# Patient Record
Sex: FEMALE
[De-identification: names, ages, dates, MRNs, and addresses within clinical notes are randomized per-mention and may not be internally consistent; named-entity substitution may affect disease eponyms.]

---

## 2019-07-24 ENCOUNTER — HOSPITAL ENCOUNTER (OUTPATIENT)
Dept: HOSPITAL 88 - OR | Age: 54
Discharge: HOME | End: 2019-07-24
Payer: COMMERCIAL

## 2019-07-24 VITALS — SYSTOLIC BLOOD PRESSURE: 111 MMHG | DIASTOLIC BLOOD PRESSURE: 68 MMHG

## 2019-07-24 DIAGNOSIS — Z79.84: ICD-10-CM

## 2019-07-24 DIAGNOSIS — E11.9: ICD-10-CM

## 2019-07-24 DIAGNOSIS — Z01.810: ICD-10-CM

## 2019-07-24 DIAGNOSIS — Z12.11: Primary | ICD-10-CM

## 2019-07-24 DIAGNOSIS — K62.1: ICD-10-CM

## 2019-07-24 DIAGNOSIS — E66.01: ICD-10-CM

## 2019-07-24 DIAGNOSIS — I10: ICD-10-CM

## 2019-07-24 DIAGNOSIS — K63.5: ICD-10-CM

## 2019-07-24 PROCEDURE — 82948 REAGENT STRIP/BLOOD GLUCOSE: CPT

## 2019-07-24 PROCEDURE — 36415 COLL VENOUS BLD VENIPUNCTURE: CPT

## 2019-07-24 PROCEDURE — 45385 COLONOSCOPY W/LESION REMOVAL: CPT

## 2019-07-24 PROCEDURE — 88305 TISSUE EXAM BY PATHOLOGIST: CPT

## 2019-07-24 PROCEDURE — 45378 DIAGNOSTIC COLONOSCOPY: CPT

## 2019-07-24 PROCEDURE — 93005 ELECTROCARDIOGRAM TRACING: CPT

## 2019-07-24 NOTE — XMS REPORT
Ambulatory Summary

                             Created on: 2018



Angela Brock

External Reference #: [id number]

: 1965

Sex: Female



Demographics







                          Address                   [address] [place] [address]  [address]

 

                          Home Phone                [phone]

 

                          Preferred Language        Unknown

 

                          Marital Status            

 

                          Protestant Affiliation     Unknown

 

                          Race                      Unknown

 

                          Ethnic Group              /Latin





Author







                          Organization              Unknown

 

                          Address                   [address] [place] [address]  [id number]



 

                          Phone                     [phone]







Care Team Providers







                    Care Team Member Name    Role                Phone

 

                    YENY SMITH MD    111                 [phone]



                                                      



Allergies

          





          Code      Code System    Name      Reaction    Severity    Status    Onset

 

             NKDA                                             



                                                                              



Medications

                      





                Name                      Status                      Start Date                      

Stop Date                                               

 

                                        Basaglar KwikPen U-100 Insulin 100 unit/mL (3 mL) subcutaneous

 Inject 20 units every day by subcutaneous route for 30 days.    Active                                 Not available



 

                                        Crestor 20 mg tablet

 Take 1 tablet every day by oral route.    Completed                              2018

 

                                        losartan 100 mg tablet

 Take 1 tablet every day by oral route.    Active                                 Not available

 

                                        metformin 1,000 mg tablet

 Take 1 tablet twice a day by oral route as directed for 90 days.    Active                                 Not

 available

 

                metformin 500 mg tablet    Active                         Not available

 

                                        Novofine 32

 QD                 Completed                              2018

 

                                        Novofine 32 32 gauge x 1/4" needle

 QD                 Active                                 Not available

 

                                        OneTouch Ultra Blue Test Strip

 QD                 Active                                 Not available

 

                simvastatin 20 mg tablet    Completed                      2018

 

                                        simvastatin 40 mg tablet

 Take 1 tablet every day by oral route as directed.    Active                                 Not available



                                                                                
                                                                                
                



Problems

                      





                Name                      Status                      Onset Date                      

Source                                                  

 

                Type 2 Diabetes Mellitus without Complication    Active          2018      

 

                Hyperlipidemia    Active          2018      

 

                Benign Essential Hypertension    Active          2018      



                                                                                
                           



Procedures

          





                Date            Name            Performed by    

 

                    2012          Other               Information not available

 

                    1992          Caesarean Section    Information not available

 

                                       Tooth Root Removal    Information not available

 

                                        Delivery    Information not available

 

                    2018          US, Abdomen         [facility]

                                        [address] [facility]  [address]

[place] [address] [phone] (Work Place)









                                        Notes: benign skin mass removal:left upper arm



                                                                                
                                     



Lab Results

                      





                Date                      Name                      Specimen                      Result

                          Interpretation                      Description                

                Value                      Range                      Status                      Address

                                                        

 

           2018    PTH (Parathyroid Hormone), Intact, Serum or Plasma               Normal                 Parathyroid

 Hormone, Intact     23 pg/mL        14-64 pg/mL     Final           [facility]:

 [address] [place] [address]

 

        2018    CBC W/ Auto Diff                            Wbc     8.18 x10*3/L    3.98-10.04 x10*3/L

                          Final                     [facility]: [address] [place]

 

                          High            Rbc     6.51 10*12/L    3.93-5.22 10*12/L    Final    [facility] [facility]

 

                                        Hemoglobin     13.30 g/dL    11.20-15.70 g/dL    Final    [facility] [address] [facility]

 

                                        Hematocrit     41.5 %    34.1-44.9 %    Final    [facility]

 Laboratory: [address] [place]

 

                          Low            Mcv     63.7 fL    80.0-100.0 fL    Final    [facility]

 [address] [facility]

 

                          Low            Mch     20.4 pg    25.6-32.2 pg    Final    [facility]:

 [address] [place]

 

                          Low            Mchc     32.0 g/dL    32.2-35.5 g/dL    Final    [facility]

 [facility]

 

                          Low            RDW-SD     35.6 fL    36.4-46.3 fL    Final    [facility]

 [facility]

 

                                        Platelet Count     340.0 k/uL    182.0-369.0 k/uL    Final    [facility]: [address] [place]

 

                          High            Mpv     11.6 fL    7.5-11.5 fL    Final    [facility]

 [address] [facility]

 

                                        Neut%     70.1 %    34.0-71.1 %    Final    [facility]

 [address] [facility]

 

                                        Lymph%     23.0 %    19.3-51.7 %    Final    [facility]

 [address] [facility]

 

                                        Mon%     5.4 %    4.7-12.5 %    Final    [facility]

 [address] [facility]

 

                                        Eos%     1.3 %    0.7-5.8 %    Final    [facility]

 [address] [facility]

 

                                        Baso%     0.2 %    0.1-1.2 %    Final    [facility]

 [address] [facility]

 

                                        Neut#     5.7 x10*3/L    1.6-6.1 x10*3/L    Final    [facility]

 Laboratory: [address] [place]

 

                                        Lymph#     1.9 x10*3/L    1.2-3.7 x10*3/L    Final    [facility]

 Laboratory: [address] [place]

 

                                        Mon#     0.4 x10*3/L    0.2-0.9 x10*3/L    Final    [facility]

 Laboratory: [address] [place]

 

                                        Eos#     0.11 x10*3/L    0.04-0.36 x10*3/L    Final    [facility]: [address] [place]

 

                                        Baso#     0.02 x10*3/L    0.01-0.08 x10*3/L    Final    [facility]

 

       2018    CMP, Serum or Plasma                         Alt     12 U/L    0-55 U/L    Final    [facility]: [address]

 

                                        Ast     13 U/L    5-34 U/L    Final    [facility]:

 [address]

 

                                        Bun     10.0 mg/dL    9.8-20.1 mg/dL    Final    [facility]

 Laboratory: [address]

 

                                        Alk Phos     92 unit/L     unit/L    Final    [facility]

 Laboratory: [address] [place] [address]

 

                          High            Glucose     193 mg/dL    70-99 mg/dL    Final    [facility]

 Laboratory: [address]

 

                                        Albumin     4.4 g/dL    3.5-5.0 g/dL    Final    [facility]

 Laboratory: [address]

 

                                        Creatinine     0.74 mg/dL    0.57-1.11 mg/dL    Final    [facility]: [address]

 

                                              eGFR  Non-african American     >60 mL/min/1.73m2    >60 mL/min/1.73m2

                          Final                     [facility]: [address]

 

                          High            Total Bilirubin     1.5 mg/dL    0.2-1.2 mg/dL    Final    [facility]: [address]

 

                                              eGFR - African American     >60 mL/min/1.73m2    >60 mL/min/1.73m2    

Final                                   [facility]: [address] [place] [address], [place]

 

                                        Sodium     137 mEq/L    136-145 mEq/L    Final    [facility]

 Laboratory: [address] [place] [address], [place]

 

                                        Potassium     4.7 mEq/L    3.5-5.1 mEq/L    Final    [facility]

 Laboratory: [address] [place] [address], [place]

 

                                        Chloride     100 mmol/L     mmol/L    Final    [facility]

 Laboratory: [address] [place] [address], [place]

 

                                        Total Protein     8.2 g/dL    6.4-8.3 g/dL    Final    [facility]

 Laboratory: [address] [place] [address], [place]

 

                          High            Calcium     10.3 mg/dL    8.4-10.2 mg/dL    Final    [facility]

 Laboratory: [address] [place] [address], [place]

 

                                        Co2     27.0 mmol/L    22.0-29.0 mmol/L    Final    [facility]

 Laboratory: [address]

 

                                        Anion Gap     10 calc          Final    [facility]:

 [address]

 

       2018    Lipid Panel, Serum                         Hdl     54 mg/dL    40-60 mg/dL    Final    [facility]: [address]

 

                                        Triglyceride     116 mg/dL    0-149 mg/dL    Final    [facility]

 Laboratory: [address]

 

                                        VLDL Calc.     23 mg/dL          Final    [facility]:

 [address]

 

                                        cholesterol/HDL Ratio     4.2 mg/dL          Final    [facility]

 Laboratory: [address]

 

                          High            non-HDL Cholesterol Calc.     175 mg/dL    0-160 mg/dL    Final    [facility]: [address]

 

                          High            Cholesterol     229 mg/dL    0-199 mg/dL    Final    [facility]

 Laboratory: [address]

 

                          High            LDL Calc.     152 mg/dL    0-130 mg/dL    Final    [facility]

 Laboratory: [address]

 

        2018    TSH, Serum or Plasma                            Tsh     1.369 uIU/mL    0.350-4.940 uIU/mL

                          Final                     [facility]: [address]

 

        2018    HbA1C (Hemoglobin a1C), Blood            High             A1C W/eag     13.6 %    1.0-

5.7 %                     Final                     [facility]: [address] [place] [address], [place]

 

                                        Average Blood Glucose     344 mg/dL          Final    [facility]

 Laboratory: [address] [place] [address], [place]

 

       2017    CBC W/ Diff                        No observation recorded.                      [facility]:

 [address], [place]

 

                   Albumin:creatinine Ratio, Urine                                  Type Urine Microlalbumin     10 mg/L

                                                          [facility]: [address] [facility]

 

                                        Type Urine Creatinine     50 mg/dL                [facility]: [address] [facility]

 

                                        Type A:C Ratio      mg/g (Abnormal)                [facility]: [address]

 [facility]



                                                                                
                                                                             



Past Encounters

                      





                                        2018

Hyperlipidemia; Type 2 Diabetes Mellitus without Complication; Benign Essential 
Hypertension; Body Mass Index 20-24 - Normal

Rick José MD: [address] [place] [phone], Ph. 
[phone]

 

                                        2018

Ravi Todd MD: [address] [place] [address], Ph. [phone]

 

                                        2018

Type 2 Diabetes Mellitus without Complication; Hyperbilirubinemia; 
Hypercalcemia; Hyperlipidemia; Benign Essential Hypertension

Rick José MD: [address] [place] [address], Ph. 
[phone]

 

                                        2018

Type 2 Diabetes Mellitus without Complication; Hyperlipidemia; Benign Essential 
Hypertension; Pre-surgery Evaluation; Pneumococcal Vaccination; Vaccination for 
Diphtheria, Pertussis, and Tetanus

Rick José MD: [address] [place] [id number], Ph. 
[phone]



                                                                                
                                     



Social History

          





                    Smoking Status      Never Smoker         



                                                                              



Vaccine List

          





                                        Vaccine Type

 

                                        influenza, unspecified formulation

 

                                        10/01/2017



                                                                              



Plan of Care

                      





                Reminders                                                                    Provider 

               

 

                Appointments    None recorded.                   

 

                Lab             None recorded.                   

 

                Referral        None recorded.                   

 

                Procedures      None recorded.                   

 

                Surgeries       None recorded.                   

 

                Imaging         None recorded.                   



                                                                              



Vitals

          2018 10:15AM Est Patient







                Height          Weight          BMI             Blood Pressure 

 

                 4 ft 11.6 in        118 lbs         23.4 kg/m2        138/70 mm[Hg]  



2018 03:00PM Est Patient







                                        Height 

 

                                         4 ft 11.6 in  



2018 04:15PM Est Patient







                Height          Weight          BMI             Blood Pressure 

 

                 4 ft 11.6 in        119 lbs         23.6 kg/m2        (1) 170/90 mm[Hg]

                                        (2) 148/80 mm[Hg]  



2018 08:30AM NP/EST CPX







                Height          Weight          BMI             Blood Pressure 

 

                 4 ft 11.6 in        116 lbs         23 kg/m2        130/80 mm[Hg]

## 2019-07-24 NOTE — XMS REPORT
Encounter Summary

                             Created on: 2019



Angela Brock

External Reference #: [id number]

: 1965

Sex: Female



Demographics







                          Address                   [address] [place] [address]  [address]

 

                          Home Phone                [phone]

 

                          Preferred Language        Unknown

 

                          Marital Status            

 

                          Muslim Affiliation     Unknown

 

                          Race                      Unknown

 

                          Ethnic Group              /Latin





Author







                          Organization              Unknown

 

                          Address                   [address] [place]  [address]



 

                          Phone                     [phone]







Care Team Providers







                    Care Team Member Name    Role                Phone

 

                    Dr. Rick Bernard    3                   [phone]

 

                    Ronald Park MD    111                 [phone]



                                                      



Reason for Visit

                      





                                        Type 2 diabetes mellitus without complication; Hyperlipidemia; Benign essential 

hypertension                



                                                                              



Instructions

          





                                        1. Benign essential hypertension

 

                                         CMP, serum or plasma

 

                                         lisinopril 10 mg tablet

 

                                        2. Hyperlipidemia

 

                                         colesterol alto: instrucciones de cuidado - [high cholesterol: care instructions]



 

                                         lipid panel, serum

 

                                         simvastatin 40 mg tablet

 

                                        3. Type II diabetes mellitus uncontrolled

 

                                         HbA1c (hemoglobin A1c), blood

 

                                         metformin 1,000 mg tablet

 

                                         Basaglar KwikPen U-100 Insulin 100 unit/mL (3 mL) subcutaneous

 

                                         OneTouch Ultra Blue Test Strip

 

                                         Novofine 32 32 gauge x 1/4" needle

 

                                        4. Eye disorder screening

 

                                         diabetic ophthalmology referral - *Please call the patient and make an appointment*



 

                                         glaucoma referral - *Please call the patient and make an appointment*







Discussion Note: None recorded.                                                 
                                      



Plan of Care

                      





                Reminders                                                                    Provider 

               

 

                Appointments    Est Patient     10/08/2019 9:00AM    Rick José MD

 

                Lab             CMP, Serum or Plasma    2019      [facility]

 

                               HbA1C (Hemoglobin a1C), Blood    2019      [facility]



 

                               Lipid Panel, Serum    2019      [facility]

 

                Referral        Diabetic Ophthalmology Referral    2019      [facility]

 

                               Glaucoma Referral    2019      [facility]

 

                Procedures      None recorded.                   

 

                Surgeries       None recorded.                   

 

                Imaging         None recorded.                   



                                                                                
                                                         



Medications

                      





                Name                      Start Date                                                

            

 

                                        Basaglar KwikPen U-100 Insulin 100 unit/mL (3 mL) subcutaneous

 Inject 28 units every day by subcutaneous route for 90 days.    

 

                                        lisinopril 10 mg tablet

 Take 1 tablet every day by oral route as directed for 90 days.    

 

                                        metformin 1,000 mg tablet

 Take 1 tablet twice a day by oral route as directed for 90 days.    

 

                                        Novofine 32 32 gauge x 1/4" needle

 QD                                     

 

                                        OneTouch Ultra Blue Test Strip

 QD                                     

 

                                        simvastatin 40 mg tablet

 Take 1 tablet every day by oral route as directed for 90 days.    



                                                                                
                                                         



Medications Administered

          



  None recorded.                                                                
               



Vitals

          





                Height          Weight          BMI             Blood Pressure 

 

                 4 ft 11.6 in        123 lbs         24.3 kg/m2        (1) 148/88 mm[Hg]

                                        (2) 164/94 mm[Hg]

                                        (3) 148/88 mm[Hg]  



                                                                              



Lab Results

                      



              None recorded.                                                    
                                                



Allergies

          





          Code      Code System    Name      Reaction    Severity    Status    Onset

 

             NKDA                                             



                                                                              



Problems

                      





                Name                      Status                      Onset Date                      

Source                                                  

 

                Type 2 Diabetes Mellitus without Complication    Active          2018      

 

                Hyperlipidemia    Active          2018      

 

                Benign Essential Hypertension    Active          2018      



                                                                                
                            



Procedures

                      





                Date                      Name                      Performed by                      

                

 

                    2012          Other               Information not available

 

                    1992          Caesarean Section    Information not available

 

                                       Tooth Root Removal    Information not available

 

                                        Delivery    Information not available



                                                                                
                                      



Vaccine List

          





                                        Vaccine Type

 

                                        influenza, injectable, quadrivalent, preservative free

 

                                        2018

 

                                        influenza, unspecified formulation

 

                                        10/01/2017

 

                                        pneumococcal polysaccharide PPV23

 

                                        07/10/50469.5 mL

 

                                        Tdap

 

                                        20190.5 mL



                                                                                
                            



Social History

          





                    Smoking Status      Never Smoker         



                                                                              



Past Encounters

                      





                                        2019

Benign Essential Hypertension; Hyperlipidemia; Type II Diabetes Mellitus 
Uncontrolled; Eye Disorder Screening

Rick José MD: [address] [place] [address], Ph. 
[phone]



                                                                                
        



History of Present Illness

          



Note:F/u on chronic conditions. Needs refills. Compliant with meds. Non 
compliant with diet or exercise. Glucose readings at home  fasting. BPs at
 home 119//77. No side effects with meds. No new concerns.                
                                                    



Review of Systems

                      





                                                   Comprehensive General Adult ROS

 

                          Reported By:              Patient

 

                          Eyes:                     Eyes: no vision change

 

                          Cardiovascular:           Cardiovascular: no chest pain, no palpitations, no lightheadedness



 

                          Respiratory:              Respiratory: no cough, no wheezing, no shortness of breath

 

                          Gastrointestinal:         Gastrointestinal: no abdominal pain, no nausea, no vomiting, 

no constipation, no diarrhea

 

                          Musculoskeletal:          Musculoskeletal: no muscle aches, no arthralgias/joint pain, no

 back pain, no swelling in the extremities

 

                          Integumentary:            Skin: no rashes

 

                          Neurologic:               Neurologic: no loss of consciousness, no headaches

 

                          Psychiatric:              Psych: no depression, no alcohol abuse, no anxiety, no suicidal thoughts



 

                          Endocrine:                Endocrine: no fatigue



                                                                              



Physical Exam

                      





                                                   General Adult Exam (male)

 

                          Reported By:              Patient

 

                          Constitutional:           General Appearance: healthy-appearing. Level of Distress: NAD

 

                          Psychiatric:              Insight: good judgement. Mental Status: active and alert, normal mood,

 normal affect. Orientation: to time, to place, to person. Memory: recent memory
 normal, remote memory normal

 

                          Eyes:                     Lids and Conjunctivae: non-injected, no discharge

 

                          Neck:                     Neck: trachea midline

 

                          Lungs:                    Auscultation: breath sounds normal

 

                          Cardiovascular:           Heart Auscultation: RRR, normal S1, normal S2, no murmurs. Neck

 vessels: no carotid bruits

 

                          Abdomen:                  Inspection and Palpation: soft, non-distended, no tenderness, no guarding



 

                          Musculoskeletal::         Motor Strength and Tone: normal, normal tone. Joints, Bones, 

and Muscles: normal movement of all extremities. Extremities: no edema

 

                          Neurologic:               Gait and Station: normal gait

## 2025-04-18 ENCOUNTER — HOSPITAL ENCOUNTER (OUTPATIENT)
Dept: HOSPITAL 88 - DX | Age: 60
End: 2025-04-18
Attending: INTERNAL MEDICINE
Payer: COMMERCIAL

## 2025-04-18 DIAGNOSIS — M81.0: Primary | ICD-10-CM

## 2025-04-18 PROCEDURE — 77080 DXA BONE DENSITY AXIAL: CPT
